# Patient Record
Sex: FEMALE | Race: BLACK OR AFRICAN AMERICAN | NOT HISPANIC OR LATINO | URBAN - NONMETROPOLITAN AREA
[De-identification: names, ages, dates, MRNs, and addresses within clinical notes are randomized per-mention and may not be internally consistent; named-entity substitution may affect disease eponyms.]

---

## 2016-11-17 PROBLEM — H52.223: Noted: 2018-01-25

## 2016-11-17 PROBLEM — H52.4: Noted: 2018-01-25

## 2016-11-17 PROBLEM — H52.13: Noted: 2018-01-25

## 2019-01-21 ENCOUNTER — IMPORTED ENCOUNTER (OUTPATIENT)
Dept: URBAN - NONMETROPOLITAN AREA CLINIC 1 | Facility: CLINIC | Age: 69
End: 2019-01-21

## 2019-01-21 PROCEDURE — 92014 COMPRE OPH EXAM EST PT 1/>: CPT

## 2019-01-21 NOTE — PATIENT DISCUSSION
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 She reports she sees well. Does not want new glasses. Gets much magnification simply by removing her glasses (she is very myopic). Does not notice any improvement with refraction. PC IOL w/ OPEN Capsules-Both intraocular lenses in place and stable

## 2019-02-18 NOTE — PATIENT DISCUSSION
(N29.140) Keratoconjunct sicca, not specified as Sjogren's, bilateral - Assesment : Examination revealed Dry Eye Syndrome - Plan : Monitor for changes. ATs prn daily.

## 2019-02-18 NOTE — PATIENT DISCUSSION
(H52.13) Myopia, bilateral - Assesment : Refractive testing reveals myopia (nearsightedness). Pt reports no longer wears CLRx. - Plan : Monitor for changes with visits. Updated GLRx given today. Call with any vision changes. RTC in 1-2 years for Exam, sooner if problems or changes.

## 2020-01-27 ENCOUNTER — IMPORTED ENCOUNTER (OUTPATIENT)
Dept: URBAN - NONMETROPOLITAN AREA CLINIC 1 | Facility: CLINIC | Age: 70
End: 2020-01-27

## 2020-01-27 PROCEDURE — 92014 COMPRE OPH EXAM EST PT 1/>: CPT

## 2020-01-27 NOTE — PATIENT DISCUSSION
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 She reports she sees well.  PC IOL w/ OPEN Capsules-Both intraocular lenses in place and stable

## 2020-07-27 ENCOUNTER — IMPORTED ENCOUNTER (OUTPATIENT)
Dept: URBAN - NONMETROPOLITAN AREA CLINIC 1 | Facility: CLINIC | Age: 70
End: 2020-07-27

## 2020-07-27 PROBLEM — H52.223: Noted: 2018-01-25

## 2020-07-27 PROBLEM — H52.4: Noted: 2018-01-25

## 2020-07-27 PROBLEM — H44.23: Noted: 2020-07-27

## 2020-07-27 PROCEDURE — 92014 COMPRE OPH EXAM EST PT 1/>: CPT

## 2020-07-27 PROCEDURE — 92134 CPTRZ OPH DX IMG PST SGM RTA: CPT

## 2020-07-27 NOTE — PATIENT DISCUSSION
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 She reports she sees well.  OCT TODAY STABLE WITH NEG CNVPC IOL w/ OPEN Capsules-Both intraocular lenses in place and stableDEGENERATIVE MYOPIA OUSTABLE TODAYMONITOR FOR RETINAL CHANGES

## 2021-01-25 ENCOUNTER — IMPORTED ENCOUNTER (OUTPATIENT)
Dept: URBAN - NONMETROPOLITAN AREA CLINIC 1 | Facility: CLINIC | Age: 71
End: 2021-01-25

## 2021-01-25 PROCEDURE — 92014 COMPRE OPH EXAM EST PT 1/>: CPT

## 2021-01-25 NOTE — PATIENT DISCUSSION
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 She reports she sees well.  STABLE TODAY / NEG CNV OUPC IOL w/ OPEN Capsules-S/P YAG PC OU-Both intraocular lenses in place and stableDEGENERATIVE MYOPIA OUSTABLE TODAYMONITOR FOR RETINAL CHANGES

## 2021-11-16 ENCOUNTER — IMPORTED ENCOUNTER (OUTPATIENT)
Dept: URBAN - NONMETROPOLITAN AREA CLINIC 1 | Facility: CLINIC | Age: 71
End: 2021-11-16

## 2021-11-16 PROCEDURE — 92134 CPTRZ OPH DX IMG PST SGM RTA: CPT

## 2021-11-16 PROCEDURE — 92014 COMPRE OPH EXAM EST PT 1/>: CPT

## 2021-11-16 NOTE — PATIENT DISCUSSION
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   She reports she sees well. oct today stable ouSTABLE TODAY / NEG CNV OUPC IOL w/ OPEN Capsules-S/P YAG PC OU-Both intraocular lenses in place and stableDEGENERATIVE MYOPIA OUSTABLE TODAYMONITOR FOR RETINAL CHANGES

## 2022-04-10 ASSESSMENT — TONOMETRY
OS_IOP_MMHG: 13
OS_IOP_MMHG: 16
OD_IOP_MMHG: 16
OD_IOP_MMHG: 14
OD_IOP_MMHG: 15
OS_IOP_MMHG: 15
OD_IOP_MMHG: 14
OS_IOP_MMHG: 14
OD_IOP_MMHG: 13
OS_IOP_MMHG: 13

## 2022-04-10 ASSESSMENT — VISUAL ACUITY
OD_SC: 20/80
OD_CC: J1
OS_SC: 20/200
OS_CC: J1
OS_PH: 20/80+
OS_SC: 20/60-
OS_SC: 20/50
OD_SC: CF4'
OD_SC: 20/200
OD_SC: 20/400
OD_PH: 20/100
OS_SC: 20/60
OD_SC: 20/400
OS_SC: 20/70+2

## 2022-06-29 ENCOUNTER — FOLLOW UP (OUTPATIENT)
Dept: RURAL CLINIC 1 | Facility: CLINIC | Age: 72
End: 2022-06-29

## 2022-06-29 DIAGNOSIS — Z96.1: ICD-10-CM

## 2022-06-29 DIAGNOSIS — H44.23: ICD-10-CM

## 2022-06-29 DIAGNOSIS — H35.3132: ICD-10-CM

## 2022-06-29 PROCEDURE — 92014 COMPRE OPH EXAM EST PT 1/>: CPT

## 2022-06-29 ASSESSMENT — VISUAL ACUITY
OU_CC: 20/50
OD_CC: 20/60
OS_CC: 20/60

## 2022-06-29 ASSESSMENT — TONOMETRY
OD_IOP_MMHG: 14
OS_IOP_MMHG: 13

## 2024-01-05 ENCOUNTER — FOLLOW UP (OUTPATIENT)
Dept: RURAL CLINIC 1 | Facility: CLINIC | Age: 74
End: 2024-01-05

## 2024-01-05 DIAGNOSIS — H44.23: ICD-10-CM

## 2024-01-05 DIAGNOSIS — H35.3132: ICD-10-CM

## 2024-01-05 DIAGNOSIS — Z96.1: ICD-10-CM

## 2024-01-05 PROCEDURE — 92014 COMPRE OPH EXAM EST PT 1/>: CPT

## 2024-01-05 ASSESSMENT — VISUAL ACUITY
OS_CC: 20/70
OD_CC: CF 2FT

## 2024-01-05 ASSESSMENT — TONOMETRY
OD_IOP_MMHG: 18
OS_IOP_MMHG: 18

## 2024-07-19 ENCOUNTER — FOLLOW UP (OUTPATIENT)
Dept: RURAL CLINIC 1 | Facility: CLINIC | Age: 74
End: 2024-07-19

## 2024-07-19 DIAGNOSIS — H44.23: ICD-10-CM

## 2024-07-19 DIAGNOSIS — Z96.1: ICD-10-CM

## 2024-07-19 DIAGNOSIS — H35.3132: ICD-10-CM

## 2024-07-19 PROCEDURE — 92014 COMPRE OPH EXAM EST PT 1/>: CPT

## 2024-07-19 PROCEDURE — 92134 CPTRZ OPH DX IMG PST SGM RTA: CPT

## 2024-07-19 ASSESSMENT — VISUAL ACUITY
OS_CC: 20/80
OS_CC: 20/40-2
OU_CC: 20/80
OD_CC: 20/100
OU_CC: 20/40-2
OD_CC: 20/40-2

## 2024-07-19 ASSESSMENT — TONOMETRY
OS_IOP_MMHG: 17
OD_IOP_MMHG: 17

## 2025-08-19 ENCOUNTER — FOLLOW UP (OUTPATIENT)
Age: 75
End: 2025-08-19

## 2025-08-19 DIAGNOSIS — Z96.1: ICD-10-CM

## 2025-08-19 DIAGNOSIS — H35.3132: ICD-10-CM

## 2025-08-19 DIAGNOSIS — H44.23: ICD-10-CM

## 2025-08-19 PROCEDURE — 92014 COMPRE OPH EXAM EST PT 1/>: CPT
